# Patient Record
Sex: MALE | Race: WHITE | Employment: UNEMPLOYED | URBAN - METROPOLITAN AREA
[De-identification: names, ages, dates, MRNs, and addresses within clinical notes are randomized per-mention and may not be internally consistent; named-entity substitution may affect disease eponyms.]

---

## 2020-01-16 ENCOUNTER — HOSPITAL ENCOUNTER (EMERGENCY)
Facility: CLINIC | Age: 35
Discharge: HOME | End: 2020-01-16
Attending: FAMILY MEDICINE | Admitting: FAMILY MEDICINE
Payer: COMMERCIAL

## 2020-01-16 VITALS
SYSTOLIC BLOOD PRESSURE: 111 MMHG | RESPIRATION RATE: 16 BRPM | HEIGHT: 67 IN | OXYGEN SATURATION: 98 % | DIASTOLIC BLOOD PRESSURE: 63 MMHG | TEMPERATURE: 98 F | WEIGHT: 160.8 LBS | BODY MASS INDEX: 25.24 KG/M2 | HEART RATE: 77 BPM

## 2020-01-16 DIAGNOSIS — F11.23 OPIOID DEPENDENCE WITH WITHDRAWAL (H): ICD-10-CM

## 2020-01-16 LAB — ALCOHOL BREATH TEST: 0 (ref 0–0.01)

## 2020-01-16 PROCEDURE — 82075 ASSAY OF BREATH ETHANOL: CPT | Performed by: FAMILY MEDICINE

## 2020-01-16 PROCEDURE — 25000132 ZZH RX MED GY IP 250 OP 250 PS 637: Performed by: FAMILY MEDICINE

## 2020-01-16 PROCEDURE — 99283 EMERGENCY DEPT VISIT LOW MDM: CPT | Performed by: FAMILY MEDICINE

## 2020-01-16 PROCEDURE — 99283 EMERGENCY DEPT VISIT LOW MDM: CPT | Mod: Z6 | Performed by: FAMILY MEDICINE

## 2020-01-16 RX ORDER — ALBUTEROL SULFATE 90 UG/1
2 AEROSOL, METERED RESPIRATORY (INHALATION) EVERY 6 HOURS
COMMUNITY

## 2020-01-16 RX ORDER — BUPRENORPHINE AND NALOXONE 4; 1 MG/1; MG/1
1 FILM, SOLUBLE BUCCAL; SUBLINGUAL ONCE
Status: COMPLETED | OUTPATIENT
Start: 2020-01-16 | End: 2020-01-16

## 2020-01-16 RX ADMIN — BUPRENORPHINE HYDROCHLORIDE, NALOXONE HYDROCHLORIDE 1 FILM: 4; 1 FILM, SOLUBLE BUCCAL; SUBLINGUAL at 14:07

## 2020-01-16 ASSESSMENT — MIFFLIN-ST. JEOR: SCORE: 1613.01

## 2020-01-16 NOTE — ED AVS SNAPSHOT
[facility], [place], Emergency Department  [address]  [facility] [address]  Phone:  [phone]  Fax:  [phone]                                    Paul Alva   MRN: [id number]    Department:  [facility], Emergency Department   Date of Visit:  1/16/2020           After Visit Summary Signature Page    I have received my discharge instructions, and my questions have been answered. I have discussed any challenges I see with this plan with the nurse or doctor.    ..........................................................................................................................................  Patient/Patient Representative Signature      ..........................................................................................................................................  Patient Representative Print Name and Relationship to Patient    ..................................................               ................................................  Date                                   Time    ..........................................................................................................................................  Reviewed by Signature/Title    ...................................................              ..............................................  Date                                               Time          22EPIC Rev 08/18

## 2020-01-16 NOTE — ED PROVIDER NOTES
"  History     Chief Complaint   Patient presents with     Addiction Problem     Heroin IV, 1/2 gram per day. Marijuana use 1-2 times per week, last use yesterday. Last use Tuesday 1/14/20. No Hx withdrawl seizures. No Benzodiazepine use. Seeking Detox and treatment.      CANDELARIO Alva is a 35 year old male who presents seeking detox from opiates.  He estimates he has been using 0.3 to 0.5 g of heroin by intravenous use method daily, for many weeks.  His last use was 36 hours ago.  He is experiencing sweats, restlessness, myalgias, anxiety and cramps.  Apparently was seen at a different emergency department several days ago for frostbite on his foot and was advised to come here for inpatient opiate detox.  He does admit to daily marijuana use, last use was also 1-1/2 days ago.  He was drinking alcohol last night, and apparently this morning it was difficult for his family to arouse him.  They felt maybe he had used heroin, however he continues to deny and states he was just \"sleeping hard\" because he was out drinking the night before.  He does not drink alcohol regularly, yesterday was a birthday celebration.  Denies any history of alcohol withdrawal.  He denies other medical complaints.  He is treated with Zoloft for depression but is not complaining of active psychiatric symptoms.    I have reviewed the Medications, Allergies, Past Medical and Surgical History, and Social History in the Epic system.    Review of Systems  All other systems were reviewed and are negative    Physical Exam   BP: 123/66  Pulse: 74  Heart Rate: 73  Temp: 96.1  F (35.6  C)  Resp: 16  Height: 170.2 cm (5' 7\")  Weight: 72.9 kg (160 lb 12.8 oz)  SpO2: 98 %      Physical Exam  Constitutional:       General: He is not in acute distress.     Appearance: He is not diaphoretic.   HENT:      Head: Atraumatic.   Eyes:      General: No scleral icterus.     Pupils: Pupils are equal, round, and reactive to light.   Cardiovascular:      Heart " sounds: Normal heart sounds.   Pulmonary:      Effort: No respiratory distress.      Breath sounds: Normal breath sounds.   Abdominal:      General: Bowel sounds are normal.      Palpations: Abdomen is soft.      Tenderness: There is no abdominal tenderness.   Musculoskeletal: Normal range of motion.         General: No tenderness.      Comments: Patient has fresh needle tracks on both arms without any signs of abscess or cellulitis.   Skin:     General: Skin is warm.      Findings: No rash.   Neurological:      General: No focal deficit present.      Mental Status: He is oriented to person, place, and time. Mental status is at baseline.   Psychiatric:         Attention and Perception: Attention normal.         Mood and Affect: Mood is depressed.         Speech: Speech normal.         Behavior: Behavior normal.         Thought Content: Thought content normal.         ED Course        Procedures             Critical Care time:  none             Labs Ordered and Resulted from Time of ED Arrival Up to the Time of Departure from the ED   ALCOHOL BREATH TEST POCT - Normal   DRUG ABUSE SCREEN 6 CHEM DEP URINE ([facility])            Assessments & Plan (with Medical Decision Making)   Patient with a history of polysubstance abuse is here seeking detox from opiates.  Reports his last use 36 hours ago and is experiencing withdrawal symptoms.  He would like a trial of Suboxone.  He was given the first dose here in the ED.  He denies other medical or psychiatric complaints and appears appropriate for follow-up in the addiction medicine clinic.  He is also interested in pursuing chemical dependency treatment.  He responded well to the first dose of Suboxone.  Patient appears stable to be discharged with close outpatient follow-up.    I have reviewed the nursing notes.    I have reviewed the findings, diagnosis, plan and need for follow up with the patient.    New Prescriptions    No medications on file       Final diagnoses:   Opioid  dependence with withdrawal (H)       1/16/2020   [facility], [place], EMERGENCY DEPARTMENT     Angelo Haddad MD  01/16/20 0416

## 2020-01-16 NOTE — ED TRIAGE NOTES
Heroin IV, 1/2 gram per day. Marijuana use 1-2 times per week, last use yesterday. Last use Tuesday 1/14/20. No Hx withdrawl seizures. No Benzodiazepine use. Seeking Detox and treatment.

## 2020-01-16 NOTE — DISCHARGE INSTRUCTIONS
Thank you for choosing [facility].     Please closely monitor for further symptoms. Return to the Emergency Department if you develop any new or worsening signs or symptoms.    If you received any opiate pain medications or sedatives during your visit, please do not drive for at least 8 hours.     Labs, cultures or final xray interpretations may still need to be reviewed.  We will call you if your plan of care needs to be changed.    Please follow up with referral to addiction medicine clinic.

## 2020-01-17 ENCOUNTER — TELEPHONE (OUTPATIENT)
Dept: ADDICTION MEDICINE | Facility: CLINIC | Age: 35
End: 2020-01-17

## 2020-01-17 NOTE — TELEPHONE ENCOUNTER
Paul was recently seen in the ED for opioid dependence.     Attempted to call Paul to offer him same-day [facility] appt with Jessica Irwin. No answer; LVM to contact clinic.    If Paul calls back and is interested in continuing Suboxone, schedule him with Jessica Irwin today at 3:30pm (currently open), then transfer to this nurse.    Joan Salas RN on 1/17/2020 at 10:03 AM